# Patient Record
Sex: FEMALE | Race: BLACK OR AFRICAN AMERICAN
[De-identification: names, ages, dates, MRNs, and addresses within clinical notes are randomized per-mention and may not be internally consistent; named-entity substitution may affect disease eponyms.]

---

## 2017-01-11 ENCOUNTER — HOSPITAL ENCOUNTER (OUTPATIENT)
Dept: HOSPITAL 61 - KCIC US | Age: 47
Discharge: HOME | End: 2017-01-11
Attending: FAMILY MEDICINE
Payer: COMMERCIAL

## 2017-01-11 DIAGNOSIS — M79.662: ICD-10-CM

## 2017-01-11 DIAGNOSIS — R25.2: ICD-10-CM

## 2017-01-11 DIAGNOSIS — I10: Primary | ICD-10-CM

## 2017-01-11 PROCEDURE — 93971 EXTREMITY STUDY: CPT

## 2017-01-11 NOTE — [facility]
PROCEDURE 

Left lower extremity venous Doppler ultrasound 

 

HISTORY 

Left lower extremity calf pain and spasms 

 

COMPARISON 

None 

 

FINDINGS 

Multiple grayscale, color, duplex spectral analysis waveform images of the

left lower extremity veins are submitted. There is normal compression and 

color flow from the left common femoral to the popliteal veins. There is 

normal phasicity. There is normal color flow of the proximal left profunda

femoris vein. There is normal color flow in segments of the left calf 

veins. 

 

IMPRESSION 

1. There is no evidence of deep venous thrombosis from the left common 

femoral to popliteal veins.

 

 

 

Electronically signed by: Garret Henriquez MD (Jan 11, 2017 15:31:59)

## 2021-05-10 ENCOUNTER — HOSPITAL ENCOUNTER (OUTPATIENT)
Dept: HOSPITAL 61 - KCIC | Age: 51
End: 2021-05-10
Attending: FAMILY MEDICINE
Payer: COMMERCIAL

## 2021-05-10 DIAGNOSIS — R06.00: Primary | ICD-10-CM

## 2021-05-10 DIAGNOSIS — M54.6: ICD-10-CM

## 2021-05-10 PROCEDURE — 71046 X-RAY EXAM CHEST 2 VIEWS: CPT

## 2021-05-10 NOTE — [facility]
INDICATION: Reason: DYSPNEA AND THORACIC BACK PAIN / Spl. Instructions: Back pain with deep breaths s
idania May 6th, acute. NKI. / History: 



COMPARISON: January 11, 2016



FINDINGS:



2 view of chest obtained.

No focal airspace consolidation or pulmonary edema.

Cardiac silhouette is unremarkable. Surgical clips in right upper quadrant.

No gross osseous destructive lesion.





IMPRESSION:



*  No focal airspace consolidation or edema.



Electronically signed by: Imer Travis MD (5/10/2021 1:11 PM) [id number]